# Patient Record
Sex: MALE | Race: NATIVE HAWAIIAN OR OTHER PACIFIC ISLANDER | HISPANIC OR LATINO | Employment: UNEMPLOYED | URBAN - METROPOLITAN AREA
[De-identification: names, ages, dates, MRNs, and addresses within clinical notes are randomized per-mention and may not be internally consistent; named-entity substitution may affect disease eponyms.]

---

## 2024-04-29 ENCOUNTER — APPOINTMENT (OUTPATIENT)
Dept: CT IMAGING | Facility: HOSPITAL | Age: 44
End: 2024-04-29
Attending: EMERGENCY MEDICINE

## 2024-04-29 ENCOUNTER — APPOINTMENT (OUTPATIENT)
Dept: MRI IMAGING | Facility: HOSPITAL | Age: 44
End: 2024-04-29
Attending: EMERGENCY MEDICINE

## 2024-04-29 ENCOUNTER — HOSPITAL ENCOUNTER (EMERGENCY)
Facility: HOSPITAL | Age: 44
Discharge: HOME | End: 2024-04-29
Attending: EMERGENCY MEDICINE | Admitting: EMERGENCY MEDICINE

## 2024-04-29 ENCOUNTER — APPOINTMENT (OUTPATIENT)
Dept: RADIOLOGY | Facility: HOSPITAL | Age: 44
End: 2024-04-29
Attending: EMERGENCY MEDICINE

## 2024-04-29 VITALS
BODY MASS INDEX: 34.82 KG/M2 | WEIGHT: 209 LBS | TEMPERATURE: 97.9 F | SYSTOLIC BLOOD PRESSURE: 138 MMHG | OXYGEN SATURATION: 97 % | DIASTOLIC BLOOD PRESSURE: 79 MMHG | HEIGHT: 65 IN | RESPIRATION RATE: 16 BRPM | HEART RATE: 83 BPM

## 2024-04-29 DIAGNOSIS — S16.1XXA CERVICAL STRAIN, INITIAL ENCOUNTER: ICD-10-CM

## 2024-04-29 DIAGNOSIS — R51.9 NONINTRACTABLE HEADACHE, UNSPECIFIED CHRONICITY PATTERN, UNSPECIFIED HEADACHE TYPE: ICD-10-CM

## 2024-04-29 DIAGNOSIS — S39.012A LOW BACK STRAIN, INITIAL ENCOUNTER: ICD-10-CM

## 2024-04-29 DIAGNOSIS — S06.0X9A CONCUSSION WITH LOSS OF CONSCIOUSNESS, INITIAL ENCOUNTER: ICD-10-CM

## 2024-04-29 PROCEDURE — 70450 CT HEAD/BRAIN W/O DYE: CPT

## 2024-04-29 PROCEDURE — 72125 CT NECK SPINE W/O DYE: CPT

## 2024-04-29 PROCEDURE — 72141 MRI NECK SPINE W/O DYE: CPT

## 2024-04-29 PROCEDURE — 72100 X-RAY EXAM L-S SPINE 2/3 VWS: CPT

## 2024-04-29 PROCEDURE — 250N000011 HC RX IP 250 OP 636: Performed by: EMERGENCY MEDICINE

## 2024-04-29 PROCEDURE — 99285 EMERGENCY DEPT VISIT HI MDM: CPT | Mod: 25

## 2024-04-29 PROCEDURE — 250N000013 HC RX MED GY IP 250 OP 250 PS 637: Performed by: EMERGENCY MEDICINE

## 2024-04-29 PROCEDURE — 96372 THER/PROPH/DIAG INJ SC/IM: CPT | Performed by: EMERGENCY MEDICINE

## 2024-04-29 PROCEDURE — 72072 X-RAY EXAM THORAC SPINE 3VWS: CPT

## 2024-04-29 RX ORDER — METHOCARBAMOL 500 MG/1
500 TABLET, FILM COATED ORAL 2 TIMES DAILY
Qty: 18 TABLET | Refills: 0 | Status: SHIPPED | OUTPATIENT
Start: 2024-04-29

## 2024-04-29 RX ORDER — METHOCARBAMOL 500 MG/1
500 TABLET, FILM COATED ORAL ONCE
Status: COMPLETED | OUTPATIENT
Start: 2024-04-29 | End: 2024-04-29

## 2024-04-29 RX ORDER — KETOROLAC TROMETHAMINE 30 MG/ML
30 INJECTION, SOLUTION INTRAMUSCULAR; INTRAVENOUS ONCE
Status: COMPLETED | OUTPATIENT
Start: 2024-04-29 | End: 2024-04-29

## 2024-04-29 RX ORDER — IBUPROFEN 600 MG/1
600 TABLET, FILM COATED ORAL EVERY 6 HOURS PRN
Qty: 30 TABLET | Refills: 0 | Status: SHIPPED | OUTPATIENT
Start: 2024-04-29

## 2024-04-29 RX ORDER — ACETAMINOPHEN 325 MG/1
975 TABLET ORAL ONCE
Status: COMPLETED | OUTPATIENT
Start: 2024-04-29 | End: 2024-04-29

## 2024-04-29 RX ORDER — ACETAMINOPHEN 500 MG
1000 TABLET ORAL 3 TIMES DAILY
Qty: 30 TABLET | Refills: 0 | Status: SHIPPED | OUTPATIENT
Start: 2024-04-29

## 2024-04-29 RX ADMIN — ACETAMINOPHEN 975 MG: 325 TABLET ORAL at 14:55

## 2024-04-29 RX ADMIN — KETOROLAC TROMETHAMINE 30 MG: 30 INJECTION, SOLUTION INTRAMUSCULAR at 14:57

## 2024-04-29 RX ADMIN — METHOCARBAMOL 500 MG: 500 TABLET ORAL at 14:57

## 2024-04-29 ASSESSMENT — COLUMBIA-SUICIDE SEVERITY RATING SCALE - C-SSRS
6. HAVE YOU EVER DONE ANYTHING, STARTED TO DO ANYTHING, OR PREPARED TO DO ANYTHING TO END YOUR LIFE?: NO
2. HAVE YOU ACTUALLY HAD ANY THOUGHTS OF KILLING YOURSELF IN THE PAST MONTH?: NO
1. IN THE PAST MONTH, HAVE YOU WISHED YOU WERE DEAD OR WISHED YOU COULD GO TO SLEEP AND NOT WAKE UP?: NO

## 2024-04-29 ASSESSMENT — ACTIVITIES OF DAILY LIVING (ADL)
ADLS_ACUITY_SCORE: 35

## 2024-04-29 NOTE — DISCHARGE INSTRUCTIONS
The pain you are experiencing has a large inflammatory component to it, so anti-inflammatories will be very helpful. Please take: 600 mg of ibuprofen AND 1000 mg of tylenol three times a day. It is ok to take these medications at the same time. After 2-3 days, please take the medications only as needed.     If you notice that you are developing a headache, blurred vision, nausea, unsteadiness or dizziness: these are all signs of post concussion syndrome.  If you notice any of the symptoms it is important to pause and take a step back from the activity, because it means that your returning to this activity faster than your body is ready.  The more you try to push through symptoms, the longer it can take to recover.    A concussion is microscopic injury to the neurons in the brain.  It is not seen on the CT scan or an MRI.  The first week after concussion is usually when symptoms are most severe, and it is also most important time to prevent any reinjury.

## 2024-04-29 NOTE — ED TRIAGE NOTES
"Patient stated \" he fell Saturday while cleaning the roof , hit the back, head, with LOC .  Patient is not on any blood thinner.  Placed neck collar in triage.      Triage Assessment (Adult)       Row Name 04/29/24 1310          Triage Assessment    Airway WDL WDL        Respiratory WDL    Respiratory WDL WDL        Skin Circulation/Temperature WDL    Skin Circulation/Temperature WDL WDL        Cardiac WDL    Cardiac WDL WDL        Peripheral/Neurovascular WDL    Peripheral Neurovascular WDL WDL        Cognitive/Neuro/Behavioral WDL    Cognitive/Neuro/Behavioral WDL WDL                     "

## 2024-04-29 NOTE — Clinical Note
Christian Coles was seen and treated in our emergency department on 4/29/2024.  He may return to work on 05/06/2024.       If you have any questions or concerns, please don't hesitate to call.      Christiano Galvan MD

## 2024-04-29 NOTE — ED NOTES
Continuous in c-collar.  is ordering an aspen collar and wants pt in a room. Pt will move pt to room 4

## 2024-04-29 NOTE — ED PROVIDER NOTES
EMERGENCY DEPARTMENT ENCOUNTER      NAME: Christian Coles  AGE: 44 year old male  YOB: 1980  MRN: [id number]  EVALUATION DATE & TIME: 4/29/2024  1:14 PM    PCP: No primary care provider on file.    ED PROVIDER: Christiano Galvan M.D.      Chief Complaint   Patient presents with    Back Pain    Neck Pain         FINAL IMPRESSION:  1. Concussion with loss of consciousness, initial encounter    2. Nonintractable headache, unspecified chronicity pattern, unspecified headache type    3. Cervical strain, initial encounter    4. Low back strain, initial encounter          ED COURSE & MEDICAL DECISION MAKING:    Pertinent Labs & Imaging studies reviewed below.  All EKGs below represent my independent interpretation.   ED Course as of 04/29/24 2130 Mon Apr 29, 2024   1624 Patient is a 44-year-old gentleman presents with headache, neck pain, upper back pain, low back pain after a fall from about 6 feet with loss of consciousness 2 days ago.  Some weakness in both arms and hands.  He is a walk-in to triage.  Normal blood pressure, heart rate.  He has midline C, T, L-spine tenderness.   strength is not robust on both arms but equal bilaterally.  He was placed in a c-collar on arrival.  This was then swapped out with a [place] collar.  He was given IM Toradol, oral Tylenol and Robaxin.   1626 CT scan of the head, cervical spine was ordered and is negative.   1626 Given his subjective weakness in the arms, plan to get an MRI of the cervical spine, if this is negative then we can remove c-collar.   1754 MR Cervical Spine w/o Contrast  1.  Mild cervical spondylosis without high-grade central stenosis.  2.  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   I removed cervical collar after MRI result came back negative.  She does not have any cervical injury that require stabilization.  I suspect his headache, dizziness is due to postconcussive syndrome.  Neck pain, back pain due to contusions and strain  from the fall.  He may have some peripheral neuropathy/neuropraxia from the fall and foraminal stenosis on the left side, but this can be followed up with his primary care team.  I placed referral.    Additional ED Course Timestamps:  2:38 PM I met the patient and performed my initial interview and exam.    Medical Decision Making  Obtained supplemental history:Supplemental history obtained?: Family Member/Significant Other  Reviewed external records: External records reviewed?: No  Care impacted by chronic illness:N/A  Care significantly affected by social determinants of health:N/A  Did you consider but not order tests?: Work up considered but not performed and documented in chart, if applicable  Did you interpret images independently?: Independent interpretation of ECG and images noted in documentation, when applicable.  Consultation discussion with other provider:Did you involve another provider (consultant, , pharmacy, etc.)?: No  Discharge. I prescribed additional prescription strength medication(s) as charted. N/A.    At the conclusion of the encounter I discussed the results of all of the tests and the disposition. The questions were answered. The patient or family acknowledged understanding and was agreeable with the care plan.       MEDICATIONS GIVEN IN THE EMERGENCY:  Medications   ketorolac (TORADOL) injection 30 mg (30 mg Intramuscular $Given 4/29/24 1457)   methocarbamol (ROBAXIN) tablet 500 mg (500 mg Oral $Given 4/29/24 1457)   acetaminophen (TYLENOL) tablet 975 mg (975 mg Oral $Given 4/29/24 1455)         NEW PRESCRIPTIONS STARTED AT TODAY'S ER VISIT  Discharge Medication List as of 4/29/2024  6:15 PM        START taking these medications    Details   acetaminophen (TYLENOL) 500 MG tablet Take 2 tablets (1,000 mg) by mouth 3 times daily, Disp-30 tablet, R-0, E-Prescribe      ibuprofen (ADVIL/MOTRIN) 600 MG tablet Take 1 tablet (600 mg) by mouth every 6 hours as needed for moderate pain, Disp-30  "tablet, R-0, E-Prescribe      methocarbamol (ROBAXIN) 500 MG tablet Take 1 tablet (500 mg) by mouth 2 times daily, Disp-18 tablet, R-0, E-Prescribe                =================================================================    HPI    Christian Coles is a 44 year old male who presents to this ED for evaluation of back pain, neck pain and left arm weakness. HPI was obtained using a .    The patient fell from a height of 6 feet on 4/27 (~2 days ago) and has since endorsed neck pain, upper back pain and left arm weakness that worsened yesterday. He reports that he did lose consciousness and notes that the upper back pain worsens when he is sitting down. The patient denies dizziness or any other complaints at this time.       VITALS:  /79   Pulse 83   Temp 97.9  F (36.6  C) (Oral)   Resp 16   Ht 1.651 m (5' 5\")   Wt 94.8 kg (209 lb)   SpO2 97%   BMI 34.78 kg/m      PHYSICAL EXAM    Constitutional: Well developed, well nourished. Uncomfortable appearing.   HENT: Atraumatic, no painful lumps, bumps to scalp. Mucous membranes moist, nose midline, bilateral nares patent. No pain along mandible, no trismus.  Eyes: PERRL, mid-range pupils, conjunctiva normal.  Neck: Midline cervical spine tenderness. No midline pain with active flexion, extension, or bilateral rotation.  Respiratory: CTAB. Normal work of breathing. No abrasions, contusion or tenderness to chest wall.  Cardiovascular: Regular rate and rhythm. 2+ radial pulses bilaterally. 2+ DP pulses bilaterally.  Musculoskeletal: Full active pain-free ROM to bilateral shoulders, elbows, wrists, hands, hips, knees, and ankles. No pain with palpation along joints and long bones. No abrasions.  Back: Midline upper thoracic and lower lumbar tenderness.  Neurologic: Alert & oriented x 3, cranial nerves grossly intact. Normal gross coordination. 4/5  strength bilaterally. 5/5 sensation in bilateral upper and lower " extremities.  Psychiatric: Affect normal, cooperative.      I, Bryant Perez am serving as a scribe to document services personally performed by Dr. Christiano Galvan based on my observation and the provider's statements to me. I, Christiano Galvan MD attest that Bryant Perez is acting in a scribe capacity, has observed my performance of the services and has documented them in accordance with my direction.    Christiano Galvan M.D.  Emergency Medicine  [facility] EMERGENCY DEPARTMENT  [address] [place] [id number]  [phone]  Dept: [phone]       Christiano Galvan MD  04/29/24 7187